# Patient Record
Sex: FEMALE | Race: WHITE | NOT HISPANIC OR LATINO | Employment: UNEMPLOYED | URBAN - METROPOLITAN AREA
[De-identification: names, ages, dates, MRNs, and addresses within clinical notes are randomized per-mention and may not be internally consistent; named-entity substitution may affect disease eponyms.]

---

## 2022-11-01 ENCOUNTER — OFFICE VISIT (OUTPATIENT)
Dept: URGENT CARE | Age: 62
End: 2022-11-01

## 2022-11-01 VITALS
HEART RATE: 75 BPM | SYSTOLIC BLOOD PRESSURE: 134 MMHG | DIASTOLIC BLOOD PRESSURE: 58 MMHG | OXYGEN SATURATION: 99 % | WEIGHT: 136.2 LBS | RESPIRATION RATE: 24 BRPM | TEMPERATURE: 97.8 F

## 2022-11-01 DIAGNOSIS — R14.0 ABDOMINAL BLOATING: ICD-10-CM

## 2022-11-01 DIAGNOSIS — R11.0 NAUSEA: Primary | ICD-10-CM

## 2022-11-01 RX ORDER — ATORVASTATIN CALCIUM 10 MG/1
1 TABLET, FILM COATED ORAL EVERY EVENING
COMMUNITY
Start: 2022-10-24

## 2022-11-01 RX ORDER — LISINOPRIL 10 MG/1
1 TABLET ORAL DAILY
COMMUNITY
Start: 2022-10-25

## 2022-11-01 RX ORDER — SIMETHICONE 125 MG
125 TABLET,CHEWABLE ORAL EVERY 6 HOURS PRN
Qty: 30 TABLET | Refills: 0 | Status: SHIPPED | OUTPATIENT
Start: 2022-11-01

## 2022-11-01 RX ORDER — ONDANSETRON 4 MG/1
4 TABLET, ORALLY DISINTEGRATING ORAL EVERY 6 HOURS PRN
Qty: 20 TABLET | Refills: 0 | Status: SHIPPED | OUTPATIENT
Start: 2022-11-01

## 2022-11-01 RX ORDER — ESCITALOPRAM OXALATE 10 MG/1
1 TABLET ORAL DAILY
COMMUNITY
Start: 2022-10-31

## 2022-11-01 NOTE — PROGRESS NOTES
[facility]        NAME: Nolan Suarez is a 58 y o  female  : 1960    MRN: [id number]  DATE: 2022  TIME: 3:40 PM    Assessment and Plan   Nausea [R11 0]  1  Nausea  ondansetron (Zofran ODT) 4 mg disintegrating tablet    simethicone (MYLICON) 667 MG chewable tablet   2  Abdominal bloating  simethicone (MYLICON) 050 MG chewable tablet     Patient presents with complaints of abdominal pain since 0100- got intense again at 1700- she has a history of IBS  Has not taken the gas x as previously encouraged  Pain gets better with movement and laying on her side  Has been having some nausea  Denies issues with BM  Denies blood  Denies fevers and urinary complaints  Assessment notes hyperactive BS  No guarding, masses or tenderness  Will order zofran and gas x  Encouraged to drink fluids, follow diet and proceed to ER for worsening/un resolving pain   Patient Instructions       Follow up with PCP in 3-5 days  Proceed to  ER if symptoms worsen  Chief Complaint     Chief Complaint   Patient presents with   • Abdominal Pain     Pt started at 1am with gas/abdominal pain, vomiting episode after BM, occurred again at 2am, 4am   States they are normal Bms  History of Present Illness       Patient presents with complaints of abdominal pain since 0100- got intense again at 1700- she has a history of IBS  Has not taken the gas x as previously encouraged  Pain gets better with movement and laying on her side  Has been having some nausea  Denies issues with BM  Denies blood  Denies fevers and urinary complaints  Assessment notes hyperactive BS  No guarding, masses or tenderness  Will order zofran and gas x  Encouraged to drink fluids, follow diet and proceed to ER for worsening/un resolving pain   Review of Systems   Review of Systems   Constitutional: Negative for chills and fever  HENT: Negative for postnasal drip and sore throat      Respiratory: Negative for cough and shortness of breath  Cardiovascular: Negative for chest pain and palpitations  Gastrointestinal: Positive for abdominal pain and nausea  Negative for blood in stool, constipation, diarrhea and vomiting  Genitourinary: Negative for dysuria  Musculoskeletal: Negative for back pain, neck pain and neck stiffness  Skin: Negative for rash  Allergic/Immunologic: Negative for food allergies  Neurological: Negative for dizziness, syncope, light-headedness and headaches  Psychiatric/Behavioral: Negative for agitation and confusion  All other systems reviewed and are negative  Current Medications       Current Outpatient Medications:   •  atorvastatin (LIPITOR) 10 mg tablet, Take 1 tablet by mouth every evening, Disp: , Rfl:   •  escitalopram (LEXAPRO) 10 mg tablet, Take 1 tablet by mouth daily, Disp: , Rfl:   •  lisinopril (ZESTRIL) 10 mg tablet, Take 1 tablet by mouth daily, Disp: , Rfl:   •  ondansetron (Zofran ODT) 4 mg disintegrating tablet, Take 1 tablet (4 mg total) by mouth every 6 (six) hours as needed for nausea or vomiting, Disp: 20 tablet, Rfl: 0  •  simethicone (MYLICON) 832 MG chewable tablet, Chew 1 tablet (125 mg total) every 6 (six) hours as needed for flatulence, Disp: 30 tablet, Rfl: 0    Current Allergies     Allergies as of 11/01/2022 - Reviewed 11/01/2022   Allergen Reaction Noted   • Aspirin Vomiting 05/08/2015            The following portions of the patient's history were reviewed and updated as appropriate: allergies, current medications, past family history, past medical history, past social history, past surgical history and problem list      Past Medical History:   Diagnosis Date   • Anxiety    • Depression    • Hyperlipidemia    • Hypertension        History reviewed  No pertinent surgical history  History reviewed  No pertinent family history  Medications have been verified          Objective   /58   Pulse 75   Temp 97 8 °F (36 6 °C)   Resp (!) 24   Wt 61 8 kg (136 lb 3 2 oz)   SpO2 99%   No LMP recorded  Physical Exam     Physical Exam  Vitals reviewed  Constitutional:       General: She is not in acute distress  Appearance: Normal appearance  She is not ill-appearing  HENT:      Head: Normocephalic and atraumatic  Eyes:      Extraocular Movements: Extraocular movements intact  Conjunctiva/sclera: Conjunctivae normal    Pulmonary:      Effort: Pulmonary effort is normal    Abdominal:      General: Abdomen is protuberant  Bowel sounds are increased  There is no distension  There are no signs of injury  Tenderness: There is no right CVA tenderness, left CVA tenderness, guarding or rebound  Skin:     General: Skin is warm  Neurological:      General: No focal deficit present  Mental Status: She is alert     Psychiatric:         Mood and Affect: Mood normal          Behavior: Behavior normal          Judgment: Judgment normal